# Patient Record
Sex: FEMALE | Race: WHITE
[De-identification: names, ages, dates, MRNs, and addresses within clinical notes are randomized per-mention and may not be internally consistent; named-entity substitution may affect disease eponyms.]

---

## 2018-12-24 ENCOUNTER — HOSPITAL ENCOUNTER (EMERGENCY)
Dept: HOSPITAL 59 - ER | Age: 58
Discharge: HOME | End: 2018-12-24
Payer: MEDICAID

## 2018-12-24 DIAGNOSIS — J20.9: Primary | ICD-10-CM

## 2018-12-24 PROCEDURE — 99282 EMERGENCY DEPT VISIT SF MDM: CPT

## 2018-12-24 NOTE — EMERGENCY DEPARTMENT RECORD
History of Present Illness





- General


Chief Complaint: Cough


Stated Complaint: CONGESTION


Time Seen by Provider: 18 07:10


Source: Patient


Mode of Arrival: Ambulatory


Limitations: No limitations





- History of Present Illness


Initial Comments: 





pt has been sick for 2 wks.  initially she had sweats and chills and aches but 

that has past now. now she has a productive yellow cough.  she does not feel 

that it is in her lungs.  she has a sore throat


MD Complaint: Cough, Nasal congestion, Sore throat


Onset/Timin


-: Week(s)


Consistency: Constant


Associated Symptoms: Cough, Nasal congestion, Rhinorrhea





- Related Data


 Previous Rx's











 Medication  Instructions  Recorded


 


Azithromycin [Zithromax] 250 mg PO DAILY #4 tab 18











 Allergies











Allergy/AdvReac Type Severity Reaction Status Date / Time


 


amoxicillin AdvReac  "it does Unverified 10/30/18 15:25





   not work"  














Travel Screening





- Travel/Exposure Within Last 30 Days


Have you traveled within the last 30 days?: No





- Travel Symptoms


Symptom Screening: None





Review of Systems


Reviewed: No additional complaints except as noted below


Constitutional: Reports: As per HPI.  Denies: Chills, Fever, Malaise, Night 

sweats, Weakness, Weight change


Eyes: Reports: As per HPI.  Denies: Eye discharge, Eye pain, Photophobia, 

Vision change


ENT: Reports: As per HPI.  Denies: Congestion, Dental pain, Ear pain, Epistaxis

, Hearing loss, Throat pain


Respiratory: Reports: As per HPI.  Denies: Cough, Dyspnea, Hemoptysis, Stridor, 

Wheezes


Cardiovascular: Reports: As per HPI.  Denies: Arrhythmia, Chest pain, Dyspnea 

on exertion, Edema, Murmurs, Orthopnea, Palpitations, Paroxysmal nocturnal 

dyspnea, Rheumatic Fever, Syncope


Endocrine: Reports: As per HPI.  Denies: Fatigue, Heat or cold intolerance, 

Polydipsia, Polyuria


Gastrointestinal: Reports: As per HPI.  Denies: Abdominal pain, Constipation, 

Diarrhea, Hematemesis, Hematochezia, Melena, Nausea, Vomiting


Genitourinary: Reports: As per HPI.  Denies: Abnormal menses, Discharge, 

Dyspareunia, Dysuria, Frequency, Hematuria, Incontinence, Retention, Urgency


Musculoskeletal: Reports: As per HPI.  Denies: Arthralgia, Back pain, Gout, 

Joint swelling, Myalgia, Neck pain


Skin: Reports: As per HPI.  Denies: Bruising, Change in color, Change in hair/

nails, Lesions, Pruritus, Rash


Neurological: Reports: As per HPI.  Denies: Abnormal gait, Confusion, Headache, 

Numbness, Paresthesias, Seizure, Tingling, Tremors, Vertigo, Weakness


Psychiatric: Reports: As per HPI.  Denies: Anxiety, Auditory hallucinations, 

Depression, Homicidal thoughts, Suicidal thoughts, Visual hallucinations


Hematological/Lymphatic: Reports: As per HPI.  Denies: Anemia, Blood Clots, 

Easy bleeding, Easy bruising, Swollen glands





Past Medical History





- SOCIAL HISTORY


Smoking Status: Never smoker


Alcohol Use: None


Drug Use: None





- RESPIRATORY


Hx Respiratory Disorders: Yes


Comment:: seasonal allergies, weekly shots prescribed by Dr. Evans





- CARDIOVASCULAR


Hx Cardio Disorders: Yes


Hx Hypertension: Yes (states on BP med cause dr put her on it -diabetes)


Comment:: high cholesterol





- NEURO


Hx Neuro Disorders: No





- GI


Hx GI Disorders: No





- 


Hx Genitourinary Disorders: No





- ENDOCRINE


Hx Endocrine Disorders: Yes


Hx Diabetes: Yes


Hx Thyroid Disease: No





- MUSCULOSKELETAL


Hx Musculoskeletal Disorders: No





- PSYCH


Hx Psych Problems: No





- HEMATOLOGY/ONCOLOGY


Hx Hematology/Oncology Disorders: No





Family Medical History


Any Significant Family History?: Yes


Hx Cancer: Grandparents


Hx Diabetes: Mother


Hx Heart Disease: Mother


Hx Stroke: Mother





Physical Exam





- General


General Appearance: Alert, Oriented x3, Cooperative, Mild distress





- Head


Head exam: Normal inspection





- Eye


Eye exam: Normal appearance, PERRL, EOMI


Pupils: Normal accommodation





- ENT


ENT exam: Normal exam, Mucous membranes moist, Normal external ear exam, Normal 

orophraynx


Ear exam: Normal external inspection.  negative: External canal tenderness


Nasal Exam: Normal inspection.  negative: Discharge, Sinus tenderness


Mouth exam: Normal external inspection, Tongue normal


Teeth exam: Normal inspection.  negative: Dental caries


Throat exam: Normal inspection.  negative: Tonsillar erythema, Tonsillar exudate





- Neck


Neck exam: Normal inspection, Full ROM.  negative: Tenderness





- Respiratory


Respiratory exam: Normal lung sounds bilaterally.  negative: Respiratory 

distress





- Cardiovascular


Cardiovascular Exam: Regular rate, Normal rhythm, Normal heart sounds





- GI/Abdominal


GI/Abdominal exam: Soft, Normal bowel sounds.  negative: Tenderness





- Rectal


Rectal exam: Deferred





- 


 exam: Deferred





- Extremities


Extremities exam: Normal inspection, Full ROM, Normal capillary refill.  

negative: Tenderness





- Back


Back exam: Reports: Normal inspection, Full ROM.  Denies: Muscle spasm, Rash 

noted, Tenderness





- Neurological


Neurological exam: Alert, CN II-XII intact, Normal gait, Oriented X3





- Psychiatric


Psychiatric exam: Normal affect, Normal mood





- Skin


Skin exam: Dry, Intact, Normal color, Warm





Course





 Vital Signs











  18





  06:31


 


Temperature 98.7 F


 


Pulse Rate [ 85





Pulse Ox Probe] 


 


Respiratory 24





Rate 


 


Blood Pressure 140/75





[Left Arm] 


 


Pulse Ox 95














Disposition


Disposition: Discharge


Clinical Impression: 


 Bronchitis





Disposition: Home, Self-Care


Condition: (1) Good


Instructions:  Acute Bronchitis (ED)


Additional Instructions: 


follow up with family doctor.  return sooner if worse.  push fluids


Prescriptions: 


Azithromycin [Zithromax] 250 mg PO DAILY #4 tab





Quality





- Quality Measures


Quality Measures: N/A





- Blood Pressure Screening


Does Patient Have Any of the Following: No


Blood Pressure Classification: Hypertensive Reading


Systolic Measurement: 140


Diastolic Measurement: 75


Screening for High Blood Pressure: < First Hypertensive BP, F/U Documented > [

]


First Hypertensive Follow-up Interventions: Follow-up with rescreen GT 1 day 

and LT 4 weeks.

## 2019-03-27 ENCOUNTER — HOSPITAL ENCOUNTER (EMERGENCY)
Dept: HOSPITAL 59 - ER | Age: 59
Discharge: HOME | End: 2019-03-27
Payer: MEDICAID
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
## 2019-03-27 DIAGNOSIS — S01.01XA: Primary | ICD-10-CM

## 2019-03-27 PROCEDURE — 99283 EMERGENCY DEPT VISIT LOW MDM: CPT

## 2019-03-27 PROCEDURE — 99284 EMERGENCY DEPT VISIT MOD MDM: CPT

## 2019-03-27 PROCEDURE — 12032 INTMD RPR S/A/T/EXT 2.6-7.5: CPT

## 2019-03-27 NOTE — EMERGENCY DEPARTMENT RECORD
History of Present Illness





- General


Chief complaint: Head Injury


Stated complaint: Head injury/ Fall


Time Seen by Provider: 19 11:24


Source: Patient


Mode of Arrival: Ambulatory


Limitations: No limitations





- History of Present Illness


Initial comments: 


The patient is here due to a slip and fall about 2 hours ago hitting the R 

posterior skull on the corner of the wall. The patient had no LOC at the time 

of the injury and has had no HA, visual changes or nausea since. She does have 

a mild pressure feeling about her R eye but she believes that is due to her 

sinuses. She denies any neck or back pain and has been ambulating normally. She 

did drive herself here with no issues.





MD Complaint: Head injury


Onset/Timin


-: Hour(s)


Mechanism of Injury: Work related injury


Location: Occipital


Loss of Consciousness: No


Previous Trauma to this Area: Yes


Place: Work


Severity: Moderate


Severity scale (1-10): 2


Quality: Aching, Dull


Provoking factors: None known


Associated Symptoms: Denies other symptoms





- Related Data


Allergies/Adverse reactions: 


 Allergies











Allergy/AdvReac Type Severity Reaction Status Date / Time


 


amoxicillin AdvReac  "it does Verified 19 11:32





   not work"  














Travel Screening





- Travel/Exposure Within Last 30 Days


Have you traveled within the last 30 days?: No





- Travel/Exposure Within Last Year


Have you traveled outside the [place] in the last year?: No





- Additonal Travel Details


Have you been exposed to anyone with a communicable illness?: No





- Travel Symptoms


Symptom Screening: None





Review of Systems


Constitutional: Denies: Chills, Fever


Eyes: Denies: Eye discharge


ENT: Denies: Congestion


Respiratory: Denies: Cough, Dyspnea





Past Medical History





- SOCIAL HISTORY


Smoking Status: Never smoker


Alcohol Use: None


Drug Use: None





- RESPIRATORY


Hx Respiratory Disorders: Yes


Comment:: seasonal allergies, weekly shots prescribed by Dr. Evans





- CARDIOVASCULAR


Hx Cardio Disorders: Yes


Hx Hypertension: Yes (states on BP med cause dr put her on it -diabetes)


Comment:: high cholesterol





- NEURO


Hx Neuro Disorders: No





- GI


Hx GI Disorders: No





- 


Hx Genitourinary Disorders: No





- ENDOCRINE


Hx Endocrine Disorders: Yes


Hx Diabetes: Yes


Hx Thyroid Disease: No





- MUSCULOSKELETAL


Hx Musculoskeletal Disorders: No





- PSYCH


Hx Psych Problems: No





- HEMATOLOGY/ONCOLOGY


Hx Hematology/Oncology Disorders: No





Family Medical History


Any Significant Family History?: Yes


Hx Cancer: Grandparents


Hx Diabetes: Mother


Hx Heart Disease: Mother


Hx Stroke: Mother





Physical Exam





- General


General Appearance: Alert, Oriented x3, Cooperative, No acute distress





- Head


Head exam: Normocephalic.  negative: Atraumatic, Normal inspection (There is a 

5 cm lac to the R posterior occiput area.)





- Eye


Eye exam: Normal appearance, PERRL, EOMI





- Neck


Neck exam: Normal inspection, Full ROM.  negative: Tenderness (There is no 

midline Cspine tenderness.)





- Respiratory


Respiratory exam: Normal lung sounds bilaterally.  negative: Respiratory 

distress





- Cardiovascular


Cardiovascular Exam: Regular rate, Normal rhythm, Normal heart sounds





- Neurological


Neurological exam: Alert, Normal gait, Oriented X3.  negative: Abnormal gait, 

Altered, Motor sensory deficit





Course





 Vital Signs











  19





  11:24


 


Temperature 97.7 F


 


Pulse Rate 74


 


Respiratory 18





Rate 


 


Blood Pressure 146/95


 


Pulse Ox 95














- Reevaluation(s)


Reevaluation #1: 


Procedure note: The scalp lac was cleansed with betadine and anesth. with 3 cc'

s [place] 1% with Epi. The wound was explored and a minimal amount of paint was 

removed. The lac was then lavaged with sterile saline and explored. There was 

no lac down to the bone. The lac was then closed with 9 staples. There were no 

complications. 


19 12:24





Reevaluation #2: 


The patient is doing very well at this time. She denies any HA or facial 

pressure around her eyes. She did eat a full lunch with no nausea or vomiting. 

The patient is up walking with no neck or back pain and has normal balance. She 

is ready for home.


19 12:59








Disposition


Disposition: Discharge


Clinical Impression: 


Laceration of scalp


Qualifiers:


 Encounter type: initial encounter Qualified Code(s): S01.01XA - Laceration 

without foreign body of scalp, initial encounter





Disposition: Home, Self-Care


Condition: (2) Stable


Instructions:  Laceration (ED), Head Injury (ED)


Additional Instructions: 


Please use Tylenol for pain and watch for any signs of a head injury. Please 

return for any head pain, nausea, vomiting, or confusion. Keep the scalp dry 

for 2 days and have the staples removed in 10 days.


Forms:  Patient Portal Access


Time of Disposition: 13:01





Quality





- Quality Measures


Quality Measures: N/A





- Blood Pressure Screening


View Details: Yes


Does Patient Have Any of the Following: No


Blood Pressure Classification: Hypertensive Reading


Systolic Measurement: 146


Diastolic Measurement: 95


Screening for High Blood Pressure: < First Hypertensive BP, F/U Documented > [

]


First Hypertensive Follow-up Interventions: Referral to alternative/primary 

care provider.

## 2019-04-06 ENCOUNTER — HOSPITAL ENCOUNTER (EMERGENCY)
Dept: HOSPITAL 59 - ER | Age: 59
Discharge: HOME | End: 2019-04-06
Payer: MEDICAID

## 2019-04-06 DIAGNOSIS — Z48.02: Primary | ICD-10-CM

## 2019-04-06 NOTE — EMERGENCY DEPARTMENT RECORD
History of Present Illness





- General


Chief Complaint: Suture removal


Stated Complaint: STAPLES REMOVED


Time Seen by Provider: 04/06/19 09:17


Source: Patient


Mode of arrival: Ambulatory


Limitations: No limitations





- History of Present Illness


Initial Comments: 





Staples to scalp 10 days ago here in ER ER.  No complaints.  Healing well 

without drainage or pain. 


MD Complaint: Suture/staple removal


Onset/Timing: 10


-: Days(s)


Initial Visit For: Laceration


Returns Today for: Staple/stitch removal


Symptoms Since Prior Visit: No new symptoms


Associated Symptoms: None





- Related Data


 Allergies











Allergy/AdvReac Type Severity Reaction Status Date / Time


 


amoxicillin AdvReac  "it does Verified 04/06/19 09:20





   not work"  














Review of Systems


Constitutional: Denies: Chills, Fever


Eyes: Denies: Eye discharge, Photophobia, Vision change


ENT: Denies: Dental pain, Ear pain


Musculoskeletal: Denies: Arthralgia, Back pain, Neck pain


Skin: Denies: Bruising


Neurological: Denies: Headache, Numbness, Weakness


Psychiatric: Denies: Anxiety





Past Medical History





- SOCIAL HISTORY


Smoking Status: Never smoker





- RESPIRATORY


Hx Respiratory Disorders: Yes


Comment:: seasonal allergies, weekly shots prescribed by Dr. Evans





- CARDIOVASCULAR


Hx Cardio Disorders: Yes


Hx Hypertension: Yes (states on BP med cause dr put her on it -diabetes)


Comment:: high cholesterol





- NEURO


Hx Neuro Disorders: No





- GI


Hx GI Disorders: No





- 


Hx Genitourinary Disorders: No





- ENDOCRINE


Hx Endocrine Disorders: Yes


Hx Diabetes: Yes


Hx Thyroid Disease: No





- MUSCULOSKELETAL


Hx Musculoskeletal Disorders: No





- PSYCH


Hx Psych Problems: No





- HEMATOLOGY/ONCOLOGY


Hx Hematology/Oncology Disorders: No





Family Medical History


Any Significant Family History?: Yes


Hx Cancer: Grandparents


Hx Diabetes: Mother


Hx Heart Disease: Mother


Hx Stroke: Mother





Physical Exam





- General


General Appearance: Alert, Oriented x3, Cooperative, No acute distress





- Head


Head exam: Atraumatic (Laceration to right posterior parietal area healing well 

with 9 staples in place.  No erythema or drainage.)





- Eye


Eye exam: Normal appearance, PERRL, EOMI





- ENT


ENT exam: Normal exam





- Neck


Neck exam: Normal inspection, Full ROM.  negative: Tenderness





- Extremities


Extremities exam: Normal inspection





- Neurological


Neurological exam: Alert, Normal gait, Oriented X3





- Skin


Skin exam: Other (healing scalp lac as above)





Course





- Reevaluation(s)


Reevaluation #1: 





04/06/19 09:24


louie nd examined.  9 Staples removed by nursing student under my direct 

supervision.  Pt tolerate well.  





Disposition


Disposition: Discharge


Clinical Impression: 


 Encounter for staple removal





Disposition: Home, Self-Care


Condition: (1) Good


Instructions:  Stitches Removal (ED)


Time of Disposition: 09:25





Quality





- Quality Measures


Quality Measures: N/A





- Blood Pressure Screening


Does Patient Have Any of the Following: No


Systolic Measurement: ~


Screening for High Blood Pressure: Patient Exclusion, Hx of HTN []